# Patient Record
Sex: MALE | Race: WHITE | Employment: FULL TIME | URBAN - METROPOLITAN AREA
[De-identification: names, ages, dates, MRNs, and addresses within clinical notes are randomized per-mention and may not be internally consistent; named-entity substitution may affect disease eponyms.]

---

## 2019-11-12 ENCOUNTER — HOSPITAL ENCOUNTER (OUTPATIENT)
Dept: PHYSICAL THERAPY | Age: 59
Discharge: HOME | End: 2019-11-12
Payer: COMMERCIAL

## 2019-11-12 PROCEDURE — 97110 THERAPEUTIC EXERCISES: CPT

## 2019-11-12 PROCEDURE — 97162 PT EVAL MOD COMPLEX 30 MIN: CPT

## 2019-11-12 NOTE — PROGRESS NOTES
Dearl Lawrence  : 1960  Payor: Zora Martinez / Plan: [employer] [facility] / Product Type: PPO /  [address]  at [facility] WENDY JANSEN  [address] [facility], [address], [address] [place] [address]  [phone]   [phone]       OUTPATIENT PHYSICAL THERAPY: Daily Treatment Note 2019  Visit Count: 1     ICD-10: Treatment Diagnosis: Cervicalgia (M54.2), Cervical disc disorder with radiculopathy, mid - cervical region (M50.12)  Precautions/Allergies:none/Patient has no allergy information on record. TREATMENT PLAN:  Effective Dates: 2019 TO 2/10/2020 (90 days). Frequency/Duration: 1 to 3 times a week for 90 days. MEDICAL/REFERRING DIAGNOSIS:  DDD cervical  DATE OF ONSET: 2019  REFERRING PHYSICIAN: Nadine Robles NP MD Orders: PT- evaluate and treat with traction treatment  Return MD Appointment:        Pre-treatment Symptoms/Complaints:  Complains of neck stiffness that becomes pain by the evening, left mid back pain and pain down to the left triceps region. Pain: Initial:   5-8 Post Session:  4-5/10   Medications Last Reviewed:  19    Updated Objective Findings:   See evaluation     TREATMENT:   Therapeutic Exercise: (10 Minutes):  Exercises to improve mobility and posture. Required moderate verbal, manual and demonstration cues to promote proper body posture and exercise technique. reviewed and issued HEP with chin tuck, scapular retraction/depression, upper trap stretch, levator scapula stretch. Treatment/Session Summary:    · Response to Treatment:  good return demonstration of exercises after review. · Communication/Consultation:  None today  · Equipment provided today:  None today  · Recommendations/Intent for next treatment session: Next visit will focus on posture correction, B shoulder ROM, HEP upgrade. Assess cervical traction. Total Treatment Billable Duration:  10 minutes after evaluation.   PT Patient Time In/Time Out  Time In: 7619  Time Out: [address] [facility] Appointments   Date Time Provider Nemo Weinberg   11/18/2019  2:30 PM Gearldean Wang, [place] [facility]   11/20/2019  1:00 PM Gearldean Wang, PT [facility]   11/22/2019  1:15 PM Gearldean Wang, PT [facility]   11/25/2019  1:00 PM Gearldean Wang, PT [facility]   11/27/2019 11:15 AM Gearldean Wang, PT [facility]   12/2/2019  1:00 PM Gearldean Wang, PT [facility]   12/3/2019  9:00 AM Gearldean Wang, PT [facility]   12/6/2019  1:15 PM Gearldean Wang, PT [facility]   12/9/2019  1:00 PM Gearldean Wang, PT [facility]   12/11/2019  9:45 AM Gearldean Wang, PT [facility]

## 2019-11-12 NOTE — THERAPY EVALUATION
Malissa Mathew  : 1960  Primary: [facility]  Secondary:  [facility] WENDY MURILLOA  [address] [facility], [address] [place] [address],[address], [facility] [address]  [phone]   [phone]       OUTPATIENT PHYSICAL THERAPY:Initial Assessment 2019   ICD-10: Treatment Diagnosis: Cervicalgia (M54.2), Cervical disc disorder with radiculopathy, mid - cervical region (M50.12)  Precautions/Allergies: none/ Patient has no allergy information on record. TREATMENT PLAN:  Effective Dates: 2019 TO 2/10/2020 (90 days). Frequency/Duration: 1 to 3 times a week for 90 Day(s) MEDICAL/REFERRING DIAGNOSIS:  DDD Cervical region  DATE OF ONSET: 3 weeks ago  REFERRING PHYSICIAN: Tania Mendez NP MD Orders: PT- evaluate and treat, with traction treatment   Return MD Appointment: \"\"     INITIAL ASSESSMENT:  Mr. Hilary Macias comes to therapy with complaints of neck and upper back pain, hand numbness and severe mid back spasms with coughing/sneezing. He presents with vertebral stiffness, postural malalignment, B shoulder stiffness and weakness. The left scapula is noted to be significantly higher and tilted as compared to the right which may contribute to the left latissimus region spasms. X rays showed decreased disc height from C5 to C7. PT testing reveals nerve tension as well as nerve compression by soft tissue. 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 He enjoys reading in a long seated position and play tennis. He needs to care for the yard and home repairs. He will benefit from skilled therapy to address his deficits and assist in return to functional ADL's with less pain. PROBLEM LIST (Impacting functional limitations):  1. Decreased Strength  2. Decreased ADL/Functional Activities  3. Increased Pain  4. Decreased Activity Tolerance  5.  Decreased Flexibility/Joint Mobility INTERVENTIONS PLANNED: (Treatment may consist of any combination of the following)  1. Cold  2. Heat  3. Home Exercise Program (HEP)  4. Manual Therapy  5. Neuromuscular Re-education/Strengthening  6. Range of Motion (ROM)   7. Mechanical cervical traction     GOALS: (Goals have been discussed and agreed upon with patient.)  Discharge Goals: Time Frame: 90 days    1. Independent with HEP    2. B shoulder AROM WNL to allow ADL's without compensation    3. B shoulder Strength 5/5 to allow ADL's with less compensation    4. Pt to demonstrate ergonomic posture correction for reading and job duties    5. Pt to report resolution of UE symptoms and back spasms    OUTCOME MEASURE:   Tool Used: Neck Disability Index (NDI)  Score:  Initial: 14/50  Most Recent: X/50 (Date: -- )   Interpretation of Score: The Neck Disability Index is a revised form of the Oswestry Low Back Pain Index and is designed to measure the activities of daily living in person's with neck pain. Each section is scored on a 0-5 scale, 5 representing the greatest disability. The scores of each section are added together for a total score of 50. MEDICAL NECESSITY:   · Skilled intervention continues to be required due to need for manual treatment and mechanical traction prior to strengthening exercise. REASON FOR SERVICES/OTHER COMMENTS:  · Patient continues to require skilled intervention due to need for guided progression into strengthening exercises for discharge. Total Duration: 55 minutes  PT Patient Time In/Time Out  Time In: 0935  Time Out: 1030    Rehabilitation Potential For Stated Goals: Good  Regarding Marissabevus Hazeln therapy, I certify that the treatment plan above will be carried out by a therapist or under their direction. Thank you for this referral,  Devon Muniz PT     Referring Physician Signature: Annika Josue NP No Signature is Required for this note.      PAIN/SUBJECTIVE:   Initial:   5-8/10 Post Session:  4-5/10 HISTORY:   History of Injury/Illness (Reason for Referral):  Pt reports having a severe sharp muscle spasm type pain in his left mid back after sneezing three weeks ago. Prednisone helped but he has since noticed that his hands go to sleep at night and continuing to have worsening neck pain in the evening before going to sleep. Neck pain keeps him from getting full night sleep. X-ray showed decreased disc height from C5 to C7. Past Medical History/Comorbidities:   Unremarkable prior to this episode  Social History/Living Environment:     pt lives with wife in a house. 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 Enjoys reading in bed or in long seated position in the couch, tennis and golf. Personal Factors: Other factors that influence how disability is experienced by the patient:  Not sleeping, not able to perform home care    Ambulatory/Rehab Services H2 Model Falls Risk Assessment   Risk Factors:       (1)  Gender [Male] Ability to Rise from Chair:       (0)  Ability to rise in a single movement   Falls Prevention Plan:       Exercise/Equipment Adaptation (specify):  see note   Total: (5 or greater = High Risk): 1   ©2010 [facility] of [place] [address] [place] [address] Patent #[id number]. Federal Law prohibits the replication, distribution or use without written permission from [facility] of [employer]   Current Medications:      anti-inflammatory,muscle relaxer, Aleve, \"Zolpedim\"? Date Last Reviewed:  11/12/19   Number of Personal Factors/Comorbidities that affect the Plan of Care: 1-2: MODERATE COMPLEXITY   EXAMINATION:   Observation/Orthostatic Postural Assessment:           Forward head with excessive upper cervical extension, B protracted scapula, left scapula higher and tilted as compared to the right. Hypertrophy B upper trap  muscles  Palpation:           Tenderness to palpate over B first ribs. Stiff B shoulder ER and horizontal abduction. Very tight B pectoralis muscles  ROM: cervical WFL     LUE AROM  L Shoulder Flexion: 140  L Shoulder Horizontal ABduction: -10(from neutral)  L Shoulder External Rotation: 60(at 80 deg abd)          RUE AROM  R Shoulder Flexion: 165  R Shoulder Horizontal ABduction: -8(from neutral)                       Strength: BUE 5/5 except:  LUE Strength  L Shoulder Horizontal ABduction: 4  L Shoulder Internal Rotation: 4(subscap)                  Special Tests:          Positive upper limb tension B median nerve with the R > L;  positive L TOS; positive L cervical Quadrant   Body Structures Involved:  1. Nerves  2. Bones  3. Joints  4. Muscles  5. Ligaments Body Functions Affected:  1. Sensory/Pain  2. Neuromusculoskeletal  3. Movement Related Activities and Participation Affected:  1. General Tasks and Demands  2.  Domestic Life  3. job duties   Number of elements (examined above) that affect the Plan of Care: 3: MODERATE COMPLEXITY   CLINICAL PRESENTATION:   Presentation: Evolving clinical presentation with changing clinical characteristics: MODERATE COMPLEXITY   CLINICAL DECISION MAKING:   Use of outcome tool(s) and clinical judgement create a POC that gives a: Questionable prediction of patient's progress: MODERATE COMPLEXITY

## 2019-11-18 ENCOUNTER — HOSPITAL ENCOUNTER (OUTPATIENT)
Dept: PHYSICAL THERAPY | Age: 59
Discharge: HOME | End: 2019-11-18
Payer: COMMERCIAL

## 2019-11-18 PROCEDURE — 97012 MECHANICAL TRACTION THERAPY: CPT

## 2019-11-18 PROCEDURE — 97110 THERAPEUTIC EXERCISES: CPT

## 2019-11-18 NOTE — PROGRESS NOTES
Gualberto Devorah  : 1960  Payor: Tommie Malia / Plan: [facility] / Product Type: PPO /  [address]  at [facility] WENDY JANSEN  [address] [facility], [address], [place] [address].  [phone]   [phone]       OUTPATIENT PHYSICAL THERAPY: Daily Treatment Note 2019  Visit Count: 2     ICD-10: Treatment Diagnosis: Cervicalgia (M54.2), Cervical disc disorder with radiculopathy, mid - cervical region (M50.12)  Precautions/Allergies:none/Patient has no allergy information on record. TREATMENT PLAN:  Effective Dates: 2019 TO 2/10/2020 (90 days). Frequency/Duration: 1 to 3 times a week for 90 days. MEDICAL/REFERRING DIAGNOSIS:  DDD cervical  DATE OF ONSET: 2019  REFERRING PHYSICIAN: Ashlee Knutson NP MD Orders: PT- evaluate and treat with traction treatment  Return MD Appointment:        Pre-treatment Symptoms/Complaints:  Complains of neck stiffness and soreness. Did the HEP without any problems. Pain: Initial:   5-8 Post Session:  4-5/10   Medications Last Reviewed:  19    Updated Objective Findings:   Observation: limited upper cervical extension to ~ 25%     TREATMENT:   Therapeutic Exercise: (28 Minutes):  Exercises to improve mobility and posture. Required moderate verbal, manual and demonstration cues to promote proper body posture and exercise technique. reviewed upgraded HEP with standing B shoulder ER at side and again at 90/90 using green tband with emphasis on scapular retraction/depression and holding chin tuck. Prone B midtrap. Stretch as needed to relax upper trap. Modality (15 minutes): mechanical cervical traction 30 sec/25# hold with 10 sec/10# rest  Treatment/Session Summary:    · Response to Treatment:  good return demonstration of exercises. Good improvement in left shoulder ROM but remains weak in new motion gained with treatment. Weak core.  Derangement during cervical extension with chin patricio  · Communication/Consultation:  None today  · Equipment provided today:  None today  · Recommendations/Intent for next treatment session: Next visit will focus on posture correction, B shoulder ROM, HEP upgrade. Assess cervical traction and symptoms down to the left tricep region.     Total Treatment Billable Duration: 43 minutes  PT Patient Time In/Time Out  Time In: 1430  Time Out: [address] [facility] [address], PT    Future Appointments   Date Time Provider Nemo Weinberg   11/20/2019  1:00 PM Dee Santos [facility]   11/22/2019  1:15 PM Nancy Cannon, PT [facility]   11/25/2019  1:00 PM [place] Cannon, PT [facility]   11/27/2019 11:15 AM Nancy Cannon, PT [facility]   12/2/2019  1:00 PM Nancy Cannon, PT [facility]   12/3/2019  9:00 AM [place] Cannon, PT [facility]   12/6/2019  1:15 PM [place] Cannon, PT [facility]   12/9/2019  1:00 PM Nancylisa Cannon, PT [facility]   12/11/2019  9:45 AM [place] Kassandra, PT [facility]

## 2019-11-20 ENCOUNTER — HOSPITAL ENCOUNTER (OUTPATIENT)
Dept: PHYSICAL THERAPY | Age: 59
Discharge: HOME | End: 2019-11-20
Payer: COMMERCIAL

## 2019-11-20 PROCEDURE — 97012 MECHANICAL TRACTION THERAPY: CPT

## 2019-11-20 PROCEDURE — 97110 THERAPEUTIC EXERCISES: CPT

## 2019-11-20 NOTE — PROGRESS NOTES
Shahnaz Dickerson  : 1960  Payor: Delphine Score / Plan: [facility] / Product Type: PPO /  [address]  at [facility] WENDY JANSEN  [address] [facility], [address], [address] [facility]  [phone]   [phone]       OUTPATIENT PHYSICAL THERAPY: Daily Treatment Note 2019  Visit Count: 3     ICD-10: Treatment Diagnosis: Cervicalgia (M54.2), Cervical disc disorder with radiculopathy, mid - cervical region (M50.12)  Precautions/Allergies:none/Patient has no allergy information on record. TREATMENT PLAN:  Effective Dates: 2019 TO 2/10/2020 (90 days). Frequency/Duration: 1 to 3 times a week for 90 days. MEDICAL/REFERRING DIAGNOSIS:  DDD cervical  DATE OF ONSET: 2019  REFERRING PHYSICIAN: Adela Johnson NP MD Orders: PT- evaluate and treat with traction treatment  Return MD Appointment: mid december       Pre-treatment Symptoms/Complaints: reports only muscle soreness from HEP. Neck feels better. Did the HEP without any problems. Mild symptoms down the right tricep region  Pain: Initial:   1-2  In neck, 4 in shoulders and muscles Post Session:  0 in neck and right tricep region, 2/10 in shoulders   Medications Last Reviewed:  19    Updated Objective Findings:   Observation: much better posture correction  ROM:      LUE AROM  L Shoulder Flexion: 150  L Shoulder Horizontal ABduction: 10(0 at 90 deg abd)  L Shoulder External Rotation: 81(after)          RUE AROM  R Shoulder Flexion: 170(after )                      TREATMENT:   Therapeutic Exercise: (29 Minutes):  Exercises to improve mobility and posture. Required moderate verbal, manual and demonstration cues to promote proper body posture and exercise technique. grade 4- AP mobilizations R C5 and L C6, grade 4 R PA glide at C6, upper cervical flexion stretch, manual cervical traction with left rotation overpressure, STM right scalenes, R upper limb nerve glide for median nerve; R 1st rib mobilizations.  Grade 4- to 4 physiological mobilizations R shoulder flexion and L shoulder ER at 75 to 100 deg abd. Modality (15 minutes): static mechanical cervical traction at 26# hold  Treatment/Session Summary:    · Response to Treatment:  good response to traction. Good improvement in R shoulder ROM. Still stiff at end ROM left shoulder flexion and horizontal abd at 90 deg abd. · Communication/Consultation:  None today  · Equipment provided today:  None today  · Recommendations/Intent for next treatment session: Next visit will focus on posture correction, B shoulder ROM, HEP upgrade as needed. Pt to perform tband exercises every other day if sore. Recommend pt assess ergonomic work area and transitional glasses for head position.     Total Treatment Billable Duration: 44 minutes  PT Patient Time In/Time Out  Time In: 1300  Time Out: [place] [address], PT    Future Appointments   Date Time Provider Nemo Weinberg   11/22/2019  1:15 PM Octaviano Kent, PT [facility]   11/25/2019  1:00 PM Octaviano Kent, PT [facility]   11/27/2019 11:15 AM Octaviano Kent, PT [facility]   12/2/2019  1:00 PM Octaviano Kent, PT [facility]   12/3/2019  9:00 AM Octaviano Kent, PT [facility]   12/6/2019  1:15 PM Octaviano Kent, PT [facility]   12/9/2019  1:00 PM Octaviano Kent, PT [facility]   12/11/2019  9:45 AM Octaviano Knet, PT [facility]

## 2019-11-22 ENCOUNTER — HOSPITAL ENCOUNTER (OUTPATIENT)
Dept: PHYSICAL THERAPY | Age: 59
Discharge: HOME | End: 2019-11-22
Payer: COMMERCIAL

## 2019-11-25 ENCOUNTER — HOSPITAL ENCOUNTER (OUTPATIENT)
Dept: PHYSICAL THERAPY | Age: 59
Discharge: HOME | End: 2019-11-25
Payer: COMMERCIAL

## 2019-11-25 PROCEDURE — 97012 MECHANICAL TRACTION THERAPY: CPT

## 2019-11-25 PROCEDURE — 97110 THERAPEUTIC EXERCISES: CPT

## 2019-11-25 NOTE — PROGRESS NOTES
Joanne Nixon  : 1960  Payor: Shane Holliday / Plan: [facility] / Product Type: PPO /  [address]  at [facility] WENDY JANSEN  [facility], [address], [address] [place] [address]  [phone]   [phone]       OUTPATIENT PHYSICAL THERAPY: Daily Treatment Note 2019  Visit Count: 4     ICD-10: Treatment Diagnosis: Cervicalgia (M54.2), Cervical disc disorder with radiculopathy, mid - cervical region (M50.12)  Precautions/Allergies:none/Patient has no allergy information on record. TREATMENT PLAN:  Effective Dates: 2019 TO 2/10/2020 (90 days). Frequency/Duration: 1 to 3 times a week for 90 days. MEDICAL/REFERRING DIAGNOSIS:  DDD cervical  DATE OF ONSET: 2019  REFERRING PHYSICIAN: Dalia Beckwith NP MD Orders: PT- evaluate and treat with traction treatment  Return MD Appointment:        Pre-treatment Symptoms/Complaints: reports muscle soreness from doing yard work yesterday. Doing the HEP. Had the catch in the left shoulder blade region after sneezing this morning. Pain: Initial:   1-2  In neck, 2 in shoulders and muscles Post Session:  0 in neck, mid back and in shoulders   Medications Last Reviewed:  19    Updated Objective Findings:   Palpation:tender over the left 7th rib  Observation:left scapula higher and tilted, head held with right tilt  ROM:                                       TREATMENT:   Therapeutic Exercise: (29 Minutes):  to improve mobility and posture. Required moderate verbal, manual and demonstration cues to promote proper body posture and exercise technique.  grade 4- AP mobilizations R C5 and L C6, grade 4 R PA glide at C6 and R T5, upper cervical flexion stretch, manual cervical traction with chin tuck and left head tilt overpressure, R upper limb nerve glide for median nerve with stabilization of 1st and 2nd rib; Grade 4- to 4 physiological mobilizations L shoulder ER at 75 to 100 deg abd; L shoulder horizontal abd stretch. MET to correct posterior left 7th rib.positional release of Left pect major and minor. Instructed pt on laying over a foam roll for self mobilization of the T spine  Modality (15 minutes): static mechanical cervical traction at 28# hold  Treatment/Session Summary:    · Response to Treatment:  good tolerance to increased weight with traction. No upper limb tension in the left median nerve, minimal at end stretch point in R. Still stiff  Shoulder horizontal abd at 90 deg abd. Feel the catc may have been a rib restriction. · Communication/Consultation:  None today  · Equipment provided today:  None today  · Recommendations/Intent for next treatment session: Next visit will focus on posture correction, B shoulder ROM, HEP upgrade as needed. Pt to perform tband exercises every other day if sore. 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     Total Treatment Billable Duration: 44 minutes  PT Patient Time In/Time Out  Time In: 1301  Time Out: [address] [facility] Cheli, PT    Future Appointments   Date Time Provider Nemo Weinberg   11/27/2019 11:15 AM Jah Corbett PT [facility]   12/2/2019  1:00 PM Jah Corbett PT [facility]   12/3/2019  9:00 AM Jah oCrbett PT [facility]   12/6/2019  1:15 PM Jah Corbett PT [facility]   12/9/2019  1:00 PM Jah Corbett PT [facility]   12/11/2019  9:45 AM Jah Corbett PT [facility]

## 2019-12-02 ENCOUNTER — HOSPITAL ENCOUNTER (OUTPATIENT)
Dept: PHYSICAL THERAPY | Age: 59
Discharge: HOME | End: 2019-12-02
Payer: COMMERCIAL

## 2019-12-02 PROCEDURE — 97110 THERAPEUTIC EXERCISES: CPT

## 2019-12-02 PROCEDURE — 97012 MECHANICAL TRACTION THERAPY: CPT

## 2019-12-02 NOTE — PROGRESS NOTES
Gualberto Devorah  : 1960  Payor: Tommie Finley / Plan: [facility] / Product Type: PPO /  [address]  at [facility] WENDY JANSEN  [facility], [address], [address] [place] [address]  [phone]   [phone]       OUTPATIENT PHYSICAL THERAPY: Daily Treatment Note 2019  Visit Count: 5     ICD-10: Treatment Diagnosis: Cervicalgia (M54.2), Cervical disc disorder with radiculopathy, mid - cervical region (M50.12)  Precautions/Allergies:none/Patient has no allergy information on record. TREATMENT PLAN:  Effective Dates: 2019 TO 2/10/2020 (90 days). Frequency/Duration: 1 to 3 times a week for 90 days. MEDICAL/REFERRING DIAGNOSIS:  DDD cervical  DATE OF ONSET: 2019  REFERRING PHYSICIAN: Ashlee Knutson NP MD Orders: PT- evaluate and treat with traction treatment  Return MD Appointment:        Pre-treatment Symptoms/Complaints: reports muscle feeling the best he ever had last week. Feel tight and had mild mid back pain when he sneezed yesterday. Called and left a message saying he would not be attending his last appt  Due to work. Doing the HEP. Traction really helps  Pain: Initial:   1-2  In neck, 2 in shoulders and muscles Post Session:  0 in neck   Medications Last Reviewed:  19    Updated Objective Findings:   Brett Going in the right upper limb median nerve and right shoulder flexion  Observation:left scapula higher and tilted but corrects with cueing  ROM: n/t                                      TREATMENT:   Therapeutic Exercise: (26 Minutes):  to improve mobility and posture. Required moderate verbal, manual and demonstration cues to promote proper body posture and exercise technique.  grade 4- AP mobilizations R C5 and L C6, grade 4 R PA glide to R T5, upper cervical flexion stretch, manual cervical traction with chin tuck and left head tilt overpressure, R and L upper limb nerve glide for median nerve with stabilization of 1st rib; Grade 4- to 4 physiological mobilizations L shoulder ER at 75 to 100 deg abd; grade 4- to 4 physiological mobilization R shoulder flexion stretch. Modality (20 minutes): intermittent mechanical cervical traction at 30 second/28# hold followed by a 10 second/10# rest to reduce pain and stiffness  Treatment/Session Summary:    · Response to Treatment:  good tolerance to increased weight with traction. Stiff in the right shoulder. Improving neck ROM. · Communication/Consultation:  None today  · Equipment provided today:  None today  · Recommendations/Intent for next treatment session: Next visit will focus on posture correction, B shoulder ROM, HEP upgrade as needed. Instruct pt on self nerve glides. Recommend pt assess purchasing a home traction unit.     Total Treatment Billable Duration: 46 minutes  PT Patient Time In/Time Out  Time In: 1300  Time Out: Lorrie Mclean, PT    Future Appointments   Date Time Provider Nemo Weinberg   12/3/2019  9:00 AM JACI Vega   12/6/2019  1:15 PM JACI Vega   12/9/2019  1:00 PM JACI Vega   12/11/2019  9:45 AM JACI VegaIUM

## 2019-12-03 ENCOUNTER — HOSPITAL ENCOUNTER (OUTPATIENT)
Dept: PHYSICAL THERAPY | Age: 59
Discharge: HOME | End: 2019-12-03
Payer: COMMERCIAL

## 2019-12-03 PROCEDURE — 97110 THERAPEUTIC EXERCISES: CPT

## 2019-12-03 PROCEDURE — 97012 MECHANICAL TRACTION THERAPY: CPT

## 2019-12-03 NOTE — PROGRESS NOTES
Maite Joel  : 1960  Payor: Santy Earing / Plan: [facility] / Product Type: PPO /  Therapy Center at [facility] WENDY JANSEN  [address] [place] [address], [address], [facility] [place].  [phone]   [phone]       OUTPATIENT PHYSICAL THERAPY: Daily Treatment Note 12/3/2019  Visit Count: 6     ICD-10: Treatment Diagnosis: Cervicalgia (M54.2), Cervical disc disorder with radiculopathy, mid - cervical region (M50.12)  Precautions/Allergies:none/Patient has no allergy information on record. TREATMENT PLAN:  Effective Dates: 2019 TO 2/10/2020 (90 days). Frequency/Duration: 1 to 3 times a week for 90 days. MEDICAL/REFERRING DIAGNOSIS:  DDD cervical  DATE OF ONSET: 2019  REFERRING PHYSICIAN: Stanley Baeza NP MD Orders: PT- evaluate and treat with traction treatment  Return MD Appointment: mid december       Pre-treatment Symptoms/Complaints: reports he is making good progress. Want to look into a home traction unit    Pain: Initial:   1  In neck and in shoulders  Post Session:  0 in neck and shoulders   Medications Last Reviewed:  19    Updated Objective Findings:   Palpation:n/t  Observation:good posture correction  ROM: n/t                                      TREATMENT:   Therapeutic Exercise: (28 Minutes):  to improve mobility and posture. Required moderate verbal, manual and demonstration cues to promote proper body posture and exercise technique.  instructed pt on home program for R and L upper limb nerve glide for median nerve with modification in standing and seated, B shoulder ER and horizontal abd position  Modality (20 minutes): followed by intermittent mechanical cervical traction at 30 second/28# hold followed by a 10 second/10# rest to reduce pain and stiffness  Treatment/Session Summary:    · Response to Treatment:  good return demonstration of nerve glides  · Communication/Consultation:  None today  · Equipment provided today:  None today  · Recommendations/Intent for next treatment session: Next visit will focus on posture correction, B shoulder ROM, HEP upgrade for shoulder strength.      Total Treatment Billable Duration: 48 minutes  PT Patient Time In/Time Out  Time In: 0900  Time Out: [address], PT    Future Appointments   Date Time Provider Nemo Weinberg   12/9/2019  1:00 PM Dee Aiken   12/11/2019  9:45 AM JACI Aiken [facility]

## 2019-12-06 ENCOUNTER — APPOINTMENT (OUTPATIENT)
Dept: PHYSICAL THERAPY | Age: 59
End: 2019-12-06
Payer: COMMERCIAL

## 2019-12-16 ENCOUNTER — HOSPITAL ENCOUNTER (OUTPATIENT)
Dept: PHYSICAL THERAPY | Age: 59
Discharge: HOME | End: 2019-12-16
Payer: COMMERCIAL

## 2019-12-16 PROCEDURE — 97012 MECHANICAL TRACTION THERAPY: CPT

## 2019-12-16 PROCEDURE — 97110 THERAPEUTIC EXERCISES: CPT

## 2019-12-16 NOTE — PROGRESS NOTES
Elo Moy  : 1960  Payor: Ash Pittman / Plan: [facility] / Product Type: PPO /  [address] at [facility] WENDY JANSEN  [address] [facility], [address], [facility] [address]  [phone]   [phone]       OUTPATIENT PHYSICAL THERAPY: Daily Treatment Note 2019  Visit Count: 7     ICD-10: Treatment Diagnosis: Cervicalgia (M54.2), Cervical disc disorder with radiculopathy, mid - cervical region (M50.12)  Precautions/Allergies:none/Patient has no allergy information on record. TREATMENT PLAN:  Effective Dates: 2019 TO 2/10/2020 (90 days). Frequency/Duration: 1 to 3 times a week for 90 days. MEDICAL/REFERRING DIAGNOSIS:  DDD cervical  DATE OF ONSET: 2019  REFERRING PHYSICIAN: Jeremy Turk NP MD Orders: PT- evaluate and treat with traction treatment  Return MD Appointment:        Pre-treatment Symptoms/Complaints: reports he is traveling a lot and is having the cramp in the left mid back when he sneezes or coughs. Have not gotten the home traction    Pain: Initial:   3  In neck and in shoulders. Post Session:  0 in neck and shoulders   Medications Last Reviewed:  19    Updated Objective Findings:   Palpation: tender over the left T11- 12 region  Observation:good posture. left scapula with significant protraction and elevation. Unstable left scapula with tremors in closed chain positions   ROM: n/t                               Strength: left serratus and low trap 4/5       TREATMENT:   Therapeutic Exercise: (35 Minutes):  to improve mobility and posture. Required moderate verbal, manual and demonstration cues to promote proper body posture and exercise technique. instructed pt on home program for prone lower trap, quadruped RUE/LLE ext, wall push up, LTR with left scapula depressed and retracted.   Modality (15 minutes): followed by intermittent mechanical cervical traction at 30 second/29# hold followed by a 10 second/12# rest to reduce pain and stiffness  Treatment/Session Summary:    · Response to Treatment:  good return demonstration of exercises but poor muscle strength resulted in spasms after 3-5 reps  · Communication/Consultation:  None today  · Equipment provided today:  None today  · Recommendations/Intent for next treatment session: Next visit will focus on posture correction, B shoulder ROM, HEP review. Total Treatment Billable Duration:50   minutes  PT Patient Time In/Time Out  Time In: 1430  Time Out: [address], PT    No future appointments.

## 2019-12-19 ENCOUNTER — HOSPITAL ENCOUNTER (OUTPATIENT)
Dept: PHYSICAL THERAPY | Age: 59
Discharge: HOME | End: 2019-12-19
Payer: COMMERCIAL

## 2019-12-19 PROCEDURE — 97110 THERAPEUTIC EXERCISES: CPT

## 2019-12-19 NOTE — PROGRESS NOTES
Patricia Alf  : 1960  Payor: Elda Hope / Plan: [facility] / Product Type: PPO /  [address]  at [facility] WENDY JANSEN  [address] [place] [address], [address], [address] [place] [address]  [phone]   [phone]       OUTPATIENT PHYSICAL THERAPY: Daily Treatment Note 2019  Visit Count: 8     ICD-10: Treatment Diagnosis: Cervicalgia (M54.2), Cervical disc disorder with radiculopathy, mid - cervical region (M50.12)  Precautions/Allergies:none/Patient has no allergy information on record. TREATMENT PLAN:  Effective Dates: 2019 TO 2/10/2020 (90 days). Frequency/Duration: 1 to 3 times a week for 90 days. MEDICAL/REFERRING DIAGNOSIS:  DDD cervical  DATE OF ONSET: 2019  REFERRING PHYSICIAN: Mitra Rosenbaum NP MD Orders: PT- evaluate and treat with traction treatment  Return MD Appointment: mid december       Pre-treatment Symptoms/Complaints: reports he is doing the HEP and changing his driving posture. Still pain with sneezing   Pain: Initial:   3 in left shoulder blade region   Post Session:  \"better\"   Medications Last Reviewed:  19    Updated Objective Findings:   Palpation: tender over the left T11- 12 and C7 region  Observation:good posture correction but unable to maintain for long   ROM: n/t                               Strength: left serratus and low trap 4/5       TREATMENT:   Therapeutic Exercise: (37 Minutes):  to improve mobility and posture. Required moderate verbal, manual and demonstration cues to promote proper body posture and exercise technique. reviewed home program for prone lower trap, quadruped RUE/LLE ext, wall push up, LTR with left scapula depressed and retracted. Manual stretching of upper cervical flexion and lower cervical extension.  Grade 4- - R unilateral PA glides at T12 and T1   Modality ( minutes): none per pt request due to time constraints  Treatment/Session Summary:    · Response to Treatment:  good response to exercises with fewer cramps. Low muscle endurance strength. ? Long thoracic nerve involvement  · Communication/Consultation:  None today  · Equipment provided today:  None today  · Recommendations/Intent for next treatment session: Next visit will focus on posture correction, B shoulder ROM, HEP review. Pt not coming next week due to holiday    Total Treatment Billable Duration:   37   minutes  PT Patient Time In/Time Out  Time In: 1135  Time Out: [address], JACI    No future appointments.

## 2020-05-22 NOTE — PROGRESS NOTES
Dipak Ty  : 1960  Primary: [employer]  [employer] [facility] Van*  Secondary:  Therapy Center at [facility] INDERJIT  [address] [facility], [address] [place] [address] [address], [address] [facility]  [phone]   [phone]       OUTPATIENT PHYSICAL THERAPY:Discontinuation Summary 2019   ICD-10: Treatment Diagnosis: Cervicalgia (M54.2), Cervical disc disorder with radiculopathy, mid - cervical region (M50.12)  Precautions/Allergies: none/ Patient has no allergy information on record. PLAN: discontinue due to pt not returning to therapy   MEDICAL/REFERRING DIAGNOSIS:  DDD Cervical region  DATE OF ONSET: 3 weeks ago  REFERRING PHYSICIAN: Idania Mena NP MD Orders: PT- evaluate and treat, with traction treatment   Return MD Appointment: \"\"   ASSESSMENT:  Mr. Antonio Ford came to therapy with complaints of neck and upper back pain, hand numbness and severe mid back spasms with coughing/sneezing. He presented last visit with improving pain level, vertebral and shoulder stiffness and short term posture correction. Question long thoracic nerve involvement due to scapular postioning and muscle weakness. Therapy focus was on manual treatment for ROM, stretching and muscle facilitation; strengthening for posture correction and weakness. Education for seated reading and symptom relieving postioning. He attended  visits with 2 cancellations and 2 no show. He was unable to attend on a regular basis due to his work schedule. GOALS:   Discharge Goals: Time Frame: 90 days    1. Independent with HEP- was being met    2. B shoulder AROM WNL to allow ADL's without compensation- met    3. B shoulder Strength 5/5 to allow ADL's with less compensation- unable to assess    4. Pt to demonstrate ergonomic posture correction for reading and job duties- unable to fully assess    5.  Pt to report resolution of UE symptoms and back spasms- was being addressed and partially met    OUTCOME MEASURE:   Tool Used: Neck Disability Index (NDI)  Score:  Initial: 14/50  Most Recent: unable to assess   Interpretation of Score: The Neck Disability Index is a revised form of the Oswestry Low Back Pain Index and is designed to measure the activities of daily living in person's with neck pain. Each section is scored on a 0-5 scale, 5 representing the greatest disability. The scores of each section are added together for a total score of 50. PAIN/SUBJECTIVE:   Initial:   5-8/10 (3/10 at last visit) Post Session:  4-5/10 (\"Better\" at last visit)   HISTORY:   History of Injury/Illness (Reason for Referral):  Pt reports having a severe sharp muscle spasm type pain in his left mid back after sneezing three weeks ago. Prednisone helped but he has since noticed that his hands go to sleep at night and continuing to have worsening neck pain in the evening before going to sleep. Neck pain keeps him from getting full night sleep. X-ray showed decreased disc height from C5 to C7. Past Medical History/Comorbidities:   Unremarkable prior to this episode  Social History/Living Environment:     pt lives with wife in a house. Pt cares for the yard and home repairs  Prior Level of Function/Work/Activity:   for [employer]. Drives in [place] as well as into [place] and [place]. 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 Enjoys reading in bed or in long seated position in the couch, tennis and golf. Personal Factors: Other factors that influence how disability is experienced by the patient:  Not sleeping, not able to perform home care       Observation/Orthostatic Postural Assessment: Forward head with excessive upper cervical extension, B protracted scapula, left scapula higher and tilted as compared to the right. Hypertrophy B upper trap  Muscles (good posture correction but unable to hold it for long - during last visit)   Palpation:           Tenderness to palpate over B first ribs.  Stiff B shoulder ER and horizontal abduction. Very tight B pectoralis muscles (n/t last visit)  ROM: cervical WFL at evaluation. Below measurements as of 11/20 visit     LUE AROM  L Shoulder Flexion: 150  L Shoulder Horizontal ABduction: 10(at 90 deg abd)  L Shoulder External Rotation: 81(after treatment)          RUE AROM  R Shoulder Flexion: 170(after treatment)                       Strength: BUE 5/5 except:(left serratus and lower trap- 4/5 as of 11/20 visit)                     Special Tests:   At evaluation        Positive upper limb tension B median nerve with the R > L;  positive L TOS; positive L cervical Quadrant

## 2025-02-20 ENCOUNTER — APPOINTMENT (OUTPATIENT)
Dept: URBAN - METROPOLITAN AREA CLINIC 329 | Facility: CLINIC | Age: 65
Setting detail: DERMATOLOGY
End: 2025-02-20

## 2025-02-20 DIAGNOSIS — D18.0 HEMANGIOMA: ICD-10-CM

## 2025-02-20 DIAGNOSIS — D22 MELANOCYTIC NEVI: ICD-10-CM

## 2025-02-20 DIAGNOSIS — L82.1 OTHER SEBORRHEIC KERATOSIS: ICD-10-CM

## 2025-02-20 DIAGNOSIS — L81.4 OTHER MELANIN HYPERPIGMENTATION: ICD-10-CM

## 2025-02-20 DIAGNOSIS — L57.8 OTHER SKIN CHANGES DUE TO CHRONIC EXPOSURE TO NONIONIZING RADIATION: ICD-10-CM

## 2025-02-20 DIAGNOSIS — D485 NEOPLASM OF UNCERTAIN BEHAVIOR OF SKIN: ICD-10-CM

## 2025-02-20 PROBLEM — D22.61 MELANOCYTIC NEVI OF RIGHT UPPER LIMB, INCLUDING SHOULDER: Status: ACTIVE | Noted: 2025-02-20

## 2025-02-20 PROBLEM — D18.01 HEMANGIOMA OF SKIN AND SUBCUTANEOUS TISSUE: Status: ACTIVE | Noted: 2025-02-20

## 2025-02-20 PROBLEM — D22.71 MELANOCYTIC NEVI OF RIGHT LOWER LIMB, INCLUDING HIP: Status: ACTIVE | Noted: 2025-02-20

## 2025-02-20 PROBLEM — D22.5 MELANOCYTIC NEVI OF TRUNK: Status: ACTIVE | Noted: 2025-02-20

## 2025-02-20 PROBLEM — D48.5 NEOPLASM OF UNCERTAIN BEHAVIOR OF SKIN: Status: ACTIVE | Noted: 2025-02-20

## 2025-02-20 PROBLEM — D22.72 MELANOCYTIC NEVI OF LEFT LOWER LIMB, INCLUDING HIP: Status: ACTIVE | Noted: 2025-02-20

## 2025-02-20 PROCEDURE — 99203 OFFICE O/P NEW LOW 30 MIN: CPT | Mod: 25

## 2025-02-20 PROCEDURE — ? BIOPSY BY SHAVE METHOD

## 2025-02-20 PROCEDURE — ? TREATMENT REGIMEN

## 2025-02-20 PROCEDURE — ? COUNSELING

## 2025-02-20 PROCEDURE — 11102 TANGNTL BX SKIN SINGLE LES: CPT

## 2025-02-20 ASSESSMENT — LOCATION DETAILED DESCRIPTION DERM
LOCATION DETAILED: LEFT DISTAL CALF
LOCATION DETAILED: EPIGASTRIC SKIN
LOCATION DETAILED: LEFT DISTAL POSTERIOR THIGH
LOCATION DETAILED: LEFT ANTERIOR DISTAL UPPER ARM
LOCATION DETAILED: RIGHT ANTERIOR DISTAL UPPER ARM
LOCATION DETAILED: LEFT PROXIMAL PRETIBIAL REGION
LOCATION DETAILED: UPPER STERNUM
LOCATION DETAILED: LEFT SUPERIOR LATERAL FOREHEAD
LOCATION DETAILED: RIGHT CLAVICULAR SKIN
LOCATION DETAILED: SUPERIOR THORACIC SPINE
LOCATION DETAILED: RIGHT SUPERIOR MEDIAL UPPER BACK
LOCATION DETAILED: MID-FRONTAL SCALP
LOCATION DETAILED: LEFT INFERIOR UPPER BACK
LOCATION DETAILED: LEFT PROXIMAL POSTERIOR THIGH
LOCATION DETAILED: MID-OCCIPITAL SCALP
LOCATION DETAILED: RIGHT VENTRAL DISTAL FOREARM
LOCATION DETAILED: RIGHT PROXIMAL POSTERIOR UPPER ARM
LOCATION DETAILED: RIGHT ANTERIOR DISTAL THIGH
LOCATION DETAILED: RIGHT PROXIMAL DORSAL FOREARM
LOCATION DETAILED: LEFT CENTRAL MALAR CHEEK

## 2025-02-20 ASSESSMENT — LOCATION SIMPLE DESCRIPTION DERM
LOCATION SIMPLE: RIGHT CLAVICULAR SKIN
LOCATION SIMPLE: LEFT FOREHEAD
LOCATION SIMPLE: RIGHT FOREARM
LOCATION SIMPLE: LEFT POSTERIOR THIGH
LOCATION SIMPLE: LEFT CALF
LOCATION SIMPLE: RIGHT UPPER BACK
LOCATION SIMPLE: LEFT PRETIBIAL REGION
LOCATION SIMPLE: LEFT CHEEK
LOCATION SIMPLE: RIGHT THIGH
LOCATION SIMPLE: POSTERIOR SCALP
LOCATION SIMPLE: LEFT UPPER BACK
LOCATION SIMPLE: UPPER BACK
LOCATION SIMPLE: ANTERIOR SCALP
LOCATION SIMPLE: ABDOMEN
LOCATION SIMPLE: LEFT UPPER ARM
LOCATION SIMPLE: RIGHT UPPER ARM
LOCATION SIMPLE: CHEST

## 2025-02-20 ASSESSMENT — LOCATION ZONE DERM
LOCATION ZONE: ARM
LOCATION ZONE: SCALP
LOCATION ZONE: TRUNK
LOCATION ZONE: LEG
LOCATION ZONE: FACE

## 2025-02-20 NOTE — PROCEDURE: BIOPSY BY SHAVE METHOD
Detail Level: Detailed
Depth Of Biopsy: dermis
Was A Bandage Applied: Yes
Size Of Lesion In Cm: 0.8
X Size Of Lesion In Cm: 0
Biopsy Type: H and E
Biopsy Method: Dermablade
Anesthesia Type: 1% lidocaine with epinephrine
Anesthesia Volume In Cc: 0.5
Hemostasis: Drysol
Wound Care: Petrolatum
Dressing: bandage
Destruction After The Procedure: No
Type Of Destruction Used: Curettage
Curettage Text: The wound bed was treated with curettage after the biopsy was performed.
Cryotherapy Text: The wound bed was treated with cryotherapy after the biopsy was performed.
Electrodesiccation Text: The wound bed was treated with electrodesiccation after the biopsy was performed.
Electrodesiccation And Curettage Text: The wound bed was treated with electrodesiccation and curettage after the biopsy was performed.
Silver Nitrate Text: The wound bed was treated with silver nitrate after the biopsy was performed.
Lab: 6
Lab Facility: 3
Medical Necessity Information: It is in your best interest to select a reason for this procedure from the list below. All of these items fulfill various CMS LCD requirements except the new and changing color options.
Consent was obtained and risks were reviewed including but not limited to scarring, infection, bleeding, scabbing, incomplete removal, nerve damage and allergy to anesthesia.
Post-Care Instructions: I reviewed with the patient in detail post-care instructions. Patient is to keep the biopsy site dry overnight, and then apply bacitracin twice daily until healed. Patient may apply hydrogen peroxide soaks to remove any crusting.
Notification Instructions: Patient will be notified of biopsy results. However, patient instructed to call the office if not contacted within 2 weeks.
Billing Type: Third-Party Bill
Information: Selecting Yes will display possible errors in your note based on the variables you have selected. This validation is only offered as a suggestion for you. PLEASE NOTE THAT THE VALIDATION TEXT WILL BE REMOVED WHEN YOU FINALIZE YOUR NOTE. IF YOU WANT TO FAX A PRELIMINARY NOTE YOU WILL NEED TO TOGGLE THIS TO 'NO' IF YOU DO NOT WANT IT IN YOUR FAXED NOTE.

## 2025-03-25 ENCOUNTER — APPOINTMENT (OUTPATIENT)
Dept: URBAN - METROPOLITAN AREA CLINIC 329 | Facility: CLINIC | Age: 65
Setting detail: DERMATOLOGY
End: 2025-03-25

## 2025-03-25 PROBLEM — C44.719 BASAL CELL CARCINOMA OF SKIN OF LEFT LOWER LIMB, INCLUDING HIP: Status: ACTIVE | Noted: 2025-03-25

## 2025-03-25 PROCEDURE — ? COUNSELING

## 2025-03-25 PROCEDURE — ? CURETTAGE AND DESTRUCTION

## 2025-03-25 PROCEDURE — 17262 DSTRJ MAL LES T/A/L 1.1-2.0: CPT

## 2025-03-25 NOTE — HPI: PROCEDURE (ELECTRODESSICATION AND CURETTAGE)
[id number]: previous_biopsy_has_been_previously_biopsied
Body Location Override (Optional): left proximal pretibial region

## 2025-03-25 NOTE — PROCEDURE: CURETTAGE AND DESTRUCTION
Body Location Override (Optional - Billing Will Still Be Based On Selected Body Map Location If Applicable): left proximal pretibial region
Detail Level: Detailed
Biopsy Photograph Reviewed: Yes
Number Of Curettages: 3
Size Of Lesion In Cm: 1
Size Of Lesion After Curettage: 2
Add Intralesional Injection: No
Concentration (Mg/Ml Or Millions Of Plaque Forming Units/Cc): 0.01
Anesthesia Type: 1% lidocaine with epinephrine
Cautery Type: electrodesiccation
What Was Performed First?: Curettage
Final Size Statement: The size of the lesion after curettage was
Additional Information: (Optional): The wound was cleaned, and a pressure dressing was applied.  The patient received detailed post-op instructions.
Consent was obtained from the patient. The risks, benefits and alternatives to therapy were discussed in detail. Specifically, the risks of infection, scarring, bleeding, prolonged wound healing, nerve injury, incomplete removal, allergy to anesthesia and recurrence were addressed. Alternatives to ED&C, such as: surgical removal and XRT were also discussed.  Prior to the procedure, the treatment site was clearly identified and confirmed by the patient.
Post-Care Instructions: I reviewed with the patient in detail post-care instructions. Patient is to keep the area dry for 48 hours, and not to engage in any swimming until the area is healed. Should the patient develop any fevers, chills, bleeding, severe pain patient will contact the office immediately.
Bill As A Line Item Or As Units: Line Item

## 2025-03-31 ENCOUNTER — RX ONLY (RX ONLY)
Age: 65
End: 2025-03-31

## 2025-03-31 RX ORDER — MUPIROCIN 20 MG/G
OINTMENT TOPICAL
Qty: 22 | Refills: 0 | Status: ERX | COMMUNITY
Start: 2025-03-31